# Patient Record
Sex: FEMALE | Race: WHITE | Employment: OTHER | URBAN - METROPOLITAN AREA
[De-identification: names, ages, dates, MRNs, and addresses within clinical notes are randomized per-mention and may not be internally consistent; named-entity substitution may affect disease eponyms.]

---

## 2024-07-17 ENCOUNTER — HOSPITAL ENCOUNTER (EMERGENCY)
Age: 44
Discharge: HOME | End: 2024-07-17
Attending: EMERGENCY MEDICINE
Payer: MEDICAID

## 2024-07-17 ENCOUNTER — APPOINTMENT (OUTPATIENT)
Dept: GENERAL RADIOLOGY | Age: 44
End: 2024-07-17
Payer: MEDICAID

## 2024-07-17 VITALS
BODY MASS INDEX: 29.02 KG/M2 | HEIGHT: 64 IN | WEIGHT: 170 LBS | SYSTOLIC BLOOD PRESSURE: 119 MMHG | HEART RATE: 92 BPM | TEMPERATURE: 98.9 F | RESPIRATION RATE: 16 BRPM | DIASTOLIC BLOOD PRESSURE: 79 MMHG | OXYGEN SATURATION: 98 %

## 2024-07-17 DIAGNOSIS — S43.005A DISLOCATION OF LEFT SHOULDER JOINT, INITIAL ENCOUNTER: ICD-10-CM

## 2024-07-17 DIAGNOSIS — S46.912A STRAIN OF LEFT SHOULDER, INITIAL ENCOUNTER: Primary | ICD-10-CM

## 2024-07-17 PROCEDURE — 73030 X-RAY EXAM OF SHOULDER: CPT

## 2024-07-17 PROCEDURE — 6370000000 HC RX 637 (ALT 250 FOR IP): Performed by: EMERGENCY MEDICINE

## 2024-07-17 PROCEDURE — 99283 EMERGENCY DEPT VISIT LOW MDM: CPT

## 2024-07-17 RX ORDER — CYCLOBENZAPRINE HCL 10 MG
10 TABLET ORAL 3 TIMES DAILY PRN
Qty: 21 TABLET | Refills: 0 | Status: SHIPPED | OUTPATIENT
Start: 2024-07-17 | End: 2024-07-27

## 2024-07-17 RX ORDER — KETOROLAC TROMETHAMINE 10 MG/1
10 TABLET, FILM COATED ORAL EVERY 6 HOURS PRN
Qty: 20 TABLET | Refills: 0 | Status: SHIPPED | OUTPATIENT
Start: 2024-07-17 | End: 2024-07-22

## 2024-07-17 RX ORDER — HYDROCODONE BITARTRATE AND ACETAMINOPHEN 5; 325 MG/1; MG/1
1 TABLET ORAL
Status: COMPLETED | OUTPATIENT
Start: 2024-07-17 | End: 2024-07-17

## 2024-07-17 RX ADMIN — HYDROCODONE BITARTRATE AND ACETAMINOPHEN 1 TABLET: 5; 325 TABLET ORAL at 19:27

## 2024-07-17 ASSESSMENT — PAIN DESCRIPTION - FREQUENCY: FREQUENCY: INTERMITTENT

## 2024-07-17 ASSESSMENT — PAIN DESCRIPTION - ONSET: ONSET: SUDDEN

## 2024-07-17 ASSESSMENT — PAIN DESCRIPTION - DESCRIPTORS: DESCRIPTORS: SHOOTING;SHARP

## 2024-07-17 ASSESSMENT — PAIN DESCRIPTION - ORIENTATION: ORIENTATION: LEFT

## 2024-07-17 ASSESSMENT — PAIN DESCRIPTION - LOCATION: LOCATION: SHOULDER

## 2024-07-17 ASSESSMENT — PAIN - FUNCTIONAL ASSESSMENT
PAIN_FUNCTIONAL_ASSESSMENT: 0-10
PAIN_FUNCTIONAL_ASSESSMENT: PREVENTS OR INTERFERES SOME ACTIVE ACTIVITIES AND ADLS

## 2024-07-17 ASSESSMENT — PAIN SCALES - GENERAL: PAINLEVEL_OUTOF10: 8

## 2024-07-17 ASSESSMENT — PAIN DESCRIPTION - PAIN TYPE: TYPE: ACUTE PAIN

## 2024-07-17 NOTE — ED PROVIDER NOTES
ROM limited secondary to pain.  No tenderness, swelling or deformity to hand, wrist, or elbow. Good ROM to hand, wrist, or elbow  Heart:  Regular rhythm and normal rate  Perfusion:  intact distal pulses  Respiratory:  Respirations nonlabored.     Neurological:  Alert and oriented times 3.  Motor and sensory grossly intact, coordination intact          Psychiatric:  Appropriate      I have reviewed and interpreted all of the currently available lab results from this visit (if applicable):  No results found for this visit on 07/17/24.   Radiographs (if obtained):  [] The following radiograph was interpreted by myself in the absence of a radiologist:   [x] Radiologist's Report Reviewed:  XR SHOULDER LEFT (MIN 2 VIEWS)    (Results Pending)       Procedures:  Procedure Note - Splint:  The benefits, risks, and alternatives of sling were discussed with the patient. Questions were sought and answered. Verbal consent was given for the procedure.  I personally supervised the procedure and inspected the extremity afterwards.  The extremity's neurovascular status was re-checked and was unchanged from the pre-procedure exam. The patient tolerated the procedure without complications. Instructions were given to return immediately for increasing pain, new numbness or weakness, a cold/pale extremity or any other worsening or worrisome concerns.      Chart review shows recent radiographs:  No results found.      MDM:     Discussion with Other Professionals : None    Social Determinants: None    Records Reviewed : None    Chronic conditions affecting care: None    Imaging interpreted and reviewed by myself: left shoulder XR showed no evidence of fracture or foreign body    Patient was given the following medications:  Medications   HYDROcodone-acetaminophen (NORCO) 5-325 MG per tablet 1 tablet (1 tablet Oral Given 7/17/24 1927)       Disposition Discussion:  I suspect a  dislocation (now reduced) and now continued pain afterwards from

## 2024-07-22 ENCOUNTER — OFFICE VISIT (OUTPATIENT)
Dept: ORTHOPEDIC SURGERY | Age: 44
End: 2024-07-22
Payer: MEDICAID

## 2024-07-22 VITALS — SYSTOLIC BLOOD PRESSURE: 134 MMHG | OXYGEN SATURATION: 100 % | HEART RATE: 108 BPM | DIASTOLIC BLOOD PRESSURE: 100 MMHG

## 2024-07-22 DIAGNOSIS — S43.432D SUPERIOR GLENOID LABRUM LESION OF LEFT SHOULDER, SUBSEQUENT ENCOUNTER: Primary | ICD-10-CM

## 2024-07-22 PROCEDURE — 99204 OFFICE O/P NEW MOD 45 MIN: CPT | Performed by: STUDENT IN AN ORGANIZED HEALTH CARE EDUCATION/TRAINING PROGRAM

## 2024-07-22 ASSESSMENT — ENCOUNTER SYMPTOMS
FACIAL SWELLING: 0
VOMITING: 0
EYE PAIN: 0
STRIDOR: 0
WHEEZING: 0
PHOTOPHOBIA: 0
ABDOMINAL PAIN: 0
COLOR CHANGE: 0
BACK PAIN: 0
NAUSEA: 0
EYE REDNESS: 0
COUGH: 0
SHORTNESS OF BREATH: 0

## 2024-07-22 NOTE — PROGRESS NOTES
7/22/2024   Chief Complaint   Patient presents with    ED Follow-up     Left shoulder        History of Present Illness:                             Tamar Macias is a 44 y.o. female left handed patient referred by ED for evaluation and treatment left shoulder pain. This is evaluated as a personal injury.  Patient was reaching behind her self to grab a 15 pound object approximately 10 days prior when she felt a painful pop and shift in her arm.  She was able to manipulate the arm to make it feel better but since then has been having worsening left shoulder pain.  She was seen in the emergency room several days later due to continued and worsening pain.  She was placed in a sling at that time.  No advanced imaging thus far.  This my first time seeing the patient.  No other treatment thus far.    Patient again is currently in sling and this is been very debilitating for her.  She has no use of the shoulder and is eager to return to work.  She is significant mechanical symptoms.    The pain occurs every day and when active. Location of pain is anterior and posterior joint line, biceps. No history of dislocation. Symptoms are aggravated by ADL's, repetitive use, work at or above shoulder height or behind her body, difficulty sleeping on affected side. Symptoms are diminished by rest, avoiding the painful activities.     Limited activities include: lifting, repetitive use, work at or above shoulder height or behind her body, difficulty sleeping, difficulty with ADLs. No stiffness is reported.     Patient denies numbness, tingling, altered sensation in the extremity.    Related history: negative for prior surgery, trauma, arthritis or disorders.     Is affecting ADLs.  Pain is 6/10 at it's worst.    Outside reports reviewed: ER imaging reviewed.     Patient's medications, allergies, past medical, surgical, social and family histories were reviewed and updated as appropriate.     Medical History  Patient's

## 2024-07-22 NOTE — PATIENT INSTRUCTIONS
No lifting, pushing or pulling exceeding 5 pounds.   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   Follow up with MRI Arthrogram results.     Central scheduling [phone] will be calling you to schedule your MRI.  If you do not hear from them with in a week give them a call.

## 2024-07-22 NOTE — PROGRESS NOTES
Patient is here for follow up on left shoulder, seen in ED 7/17/24. States she had a dislocation around 7/12/24 when she was trying to lift a ~15 pound object off of the ground and felt her \"arm pop out.\" States her boyfriend was able to reduce her shoulder, but pain did not improve over the following week while she was using her arm at work. Has been using sling since seen in 7/17/24.     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   Left hand dominant.

## 2024-07-23 ENCOUNTER — TELEPHONE (OUTPATIENT)
Dept: ORTHOPEDIC SURGERY | Age: 44
End: 2024-07-23

## 2024-07-23 NOTE — TELEPHONE ENCOUNTER
Spoke with Rafaela at [facility] for a STAT Arthrogram of the left shoulder.     They didn't find a referral for the arthrogram.    Referral was called in as STAT to [facility]  Scheduled for Thursday 10 AM 07/25/2024    Address: [address] [address]  Please have someone drive ya home.  List of medication, no snap, button, clip, and no metal.    Patient was advised to bring a disc

## 2024-08-05 ENCOUNTER — OFFICE VISIT (OUTPATIENT)
Dept: ORTHOPEDIC SURGERY | Age: 44
End: 2024-08-05
Payer: MEDICAID

## 2024-08-05 VITALS — SYSTOLIC BLOOD PRESSURE: 142 MMHG | OXYGEN SATURATION: 100 % | HEART RATE: 112 BPM | DIASTOLIC BLOOD PRESSURE: 90 MMHG

## 2024-08-05 DIAGNOSIS — M75.22 BICEPS TENDINITIS OF LEFT SHOULDER: Primary | ICD-10-CM

## 2024-08-05 DIAGNOSIS — M67.912 TENDINOPATHY OF LEFT ROTATOR CUFF: ICD-10-CM

## 2024-08-05 DIAGNOSIS — M75.82 ROTATOR CUFF TENDINITIS, LEFT: ICD-10-CM

## 2024-08-05 PROCEDURE — 20610 DRAIN/INJ JOINT/BURSA W/O US: CPT | Performed by: STUDENT IN AN ORGANIZED HEALTH CARE EDUCATION/TRAINING PROGRAM

## 2024-08-05 PROCEDURE — 99214 OFFICE O/P EST MOD 30 MIN: CPT | Performed by: STUDENT IN AN ORGANIZED HEALTH CARE EDUCATION/TRAINING PROGRAM

## 2024-08-05 RX ORDER — MELOXICAM 15 MG/1
15 TABLET ORAL DAILY PRN
Qty: 30 TABLET | Refills: 0 | Status: SHIPPED | OUTPATIENT
Start: 2024-08-05

## 2024-08-05 RX ORDER — TRIAMCINOLONE ACETONIDE 40 MG/ML
40 INJECTION, SUSPENSION INTRA-ARTICULAR; INTRAMUSCULAR ONCE
Status: COMPLETED | OUTPATIENT
Start: 2024-08-05 | End: 2024-08-05

## 2024-08-05 RX ADMIN — TRIAMCINOLONE ACETONIDE 40 MG: 40 INJECTION, SUSPENSION INTRA-ARTICULAR; INTRAMUSCULAR at 15:13

## 2024-08-05 ASSESSMENT — ENCOUNTER SYMPTOMS
EYE REDNESS: 0
COUGH: 0
FACIAL SWELLING: 0
BACK PAIN: 0
COLOR CHANGE: 0
ABDOMINAL PAIN: 0
PHOTOPHOBIA: 0
NAUSEA: 0
WHEEZING: 0
SHORTNESS OF BREATH: 0
STRIDOR: 0
EYE PAIN: 0
VOMITING: 0

## 2024-08-05 NOTE — PROGRESS NOTES
the defined types were placed in this encounter.  Left subacromial Shoulder Injection Procedure Note   Pre-operative Diagnosis: Left rotator cuff tendinitis   Post-operative Diagnosis: same   Indications: Symptomatic relief of tendinitis   Anesthesia: Lidocaine 1% and marcaine 0.5% without epinephrine without added sodium bicarbonate   Procedure Details   Verbal consent was obtained for the procedure. The shoulder was prepped with alcohol. A 22 gauge needle was advanced into the subacromial space through posterior approach without difficulty The space was then injected with 1 ml 1% lidocaine and 1 ml 0.5% marcaine and 1 ml of triamcinolone (KENALOG) 40mg/ml. The injection site was cleansed with isopropyl alcohol and a dressing was applied.   Complications: None; patient tolerated the procedure well. Symptoms were improved immediately after the injection.     Assessment and Plan    A: Left shoulder pain, biceps tendinitis with rotator cuff tendinopathy    P:   I had a thorough conversation the patient regarding her current physical exam findings, imaging and treatment course.  I explained that there were no concerning findings for any type of tear but she does have inflammation on her biceps and the rotator cuff which I do feel is causing issue.  I explained that these issues are nonsurgical in nature but we did briefly discuss what surgery would entail if needed in the future but we are trying to avoid that at this time.  She is also been doing excess amounts of guarding which I feel will become a problem if is not addressed.  At this time I suggest a subacromial injection to help with some the inflammation and pain and she agreed.  This was performed without complication.  She was also given prescriptions for Mobic as well as a compound cream to be used in the shoulder.  We also placed a referral to formal outpatient physical therapy to work on range of motion and strengthening to begin this week in order to

## 2024-08-05 NOTE — PATIENT INSTRUCTIONS
Continue weight-bearing as tolerated.  Continue range of motion exercises as instructed.  Ice and elevate as needed.  Tylenol or Motrin for pain.  Follow up in 3 months.

## 2024-08-08 ENCOUNTER — TELEPHONE (OUTPATIENT)
Dept: ORTHOPEDIC SURGERY | Age: 44
End: 2024-08-08

## 2024-09-09 ENCOUNTER — OFFICE VISIT (OUTPATIENT)
Dept: ORTHOPEDIC SURGERY | Age: 44
End: 2024-09-09
Payer: MEDICAID

## 2024-09-09 VITALS — SYSTOLIC BLOOD PRESSURE: 138 MMHG | HEART RATE: 87 BPM | DIASTOLIC BLOOD PRESSURE: 84 MMHG | OXYGEN SATURATION: 97 %

## 2024-09-09 DIAGNOSIS — M75.82 ROTATOR CUFF TENDINITIS, LEFT: Primary | ICD-10-CM

## 2024-09-09 PROCEDURE — 99213 OFFICE O/P EST LOW 20 MIN: CPT | Performed by: STUDENT IN AN ORGANIZED HEALTH CARE EDUCATION/TRAINING PROGRAM

## 2024-09-09 ASSESSMENT — ENCOUNTER SYMPTOMS
PHOTOPHOBIA: 0
EYE REDNESS: 0
VOMITING: 0
EYE PAIN: 0
NAUSEA: 0
COUGH: 0
ABDOMINAL PAIN: 0
FACIAL SWELLING: 0
STRIDOR: 0
BACK PAIN: 0
WHEEZING: 0
SHORTNESS OF BREATH: 0
COLOR CHANGE: 0

## 2024-09-20 ENCOUNTER — TELEPHONE (OUTPATIENT)
Dept: ORTHOPEDIC SURGERY | Age: 44
End: 2024-09-20

## 2024-10-21 ENCOUNTER — OFFICE VISIT (OUTPATIENT)
Dept: ORTHOPEDIC SURGERY | Age: 44
End: 2024-10-21
Payer: MEDICAID

## 2024-10-21 ENCOUNTER — TELEPHONE (OUTPATIENT)
Dept: ORTHOPEDIC SURGERY | Age: 44
End: 2024-10-21

## 2024-10-21 VITALS — HEART RATE: 98 BPM | OXYGEN SATURATION: 99 %

## 2024-10-21 DIAGNOSIS — M75.22 BICEPS TENDINITIS OF LEFT SHOULDER: Primary | ICD-10-CM

## 2024-10-21 PROCEDURE — 20611 DRAIN/INJ JOINT/BURSA W/US: CPT | Performed by: STUDENT IN AN ORGANIZED HEALTH CARE EDUCATION/TRAINING PROGRAM

## 2024-10-21 PROCEDURE — 99213 OFFICE O/P EST LOW 20 MIN: CPT | Performed by: STUDENT IN AN ORGANIZED HEALTH CARE EDUCATION/TRAINING PROGRAM

## 2024-10-21 RX ORDER — TRIAMCINOLONE ACETONIDE 40 MG/ML
40 INJECTION, SUSPENSION INTRA-ARTICULAR; INTRAMUSCULAR ONCE
Status: COMPLETED | OUTPATIENT
Start: 2024-10-21 | End: 2024-10-21

## 2024-10-21 RX ORDER — NAPROXEN 500 MG/1
500 TABLET ORAL 2 TIMES DAILY WITH MEALS
Qty: 60 TABLET | Refills: 0 | Status: SHIPPED | OUTPATIENT
Start: 2024-10-21

## 2024-10-21 RX ORDER — CYCLOBENZAPRINE HCL 5 MG
5 TABLET ORAL 2 TIMES DAILY PRN
Qty: 30 TABLET | Refills: 0 | Status: SHIPPED | OUTPATIENT
Start: 2024-10-21 | End: 2024-10-31

## 2024-10-21 RX ADMIN — TRIAMCINOLONE ACETONIDE 40 MG: 40 INJECTION, SUSPENSION INTRA-ARTICULAR; INTRAMUSCULAR at 16:20

## 2024-10-21 ASSESSMENT — ENCOUNTER SYMPTOMS
SHORTNESS OF BREATH: 0
NAUSEA: 0
COUGH: 0
VOMITING: 0
STRIDOR: 0
COLOR CHANGE: 0
BACK PAIN: 0
FACIAL SWELLING: 0
WHEEZING: 0
PHOTOPHOBIA: 0
ABDOMINAL PAIN: 0
EYE REDNESS: 0
EYE PAIN: 0

## 2024-10-21 NOTE — PATIENT INSTRUCTIONS
POST-INJECTION INSTRUCTIONS: Ice  for 15-20 minutes as needed to relieve any injection site related pain. Decrease activity for the next 24 to 48 hours. May use prescription or OTC pain relievers as needed. If at any time you are concerned you may contact the office for further instructions or care.   Follow up in 3 months. Please call to move up the appointment if you are still having pain.   Medications sent to your pharmacy.

## 2024-10-21 NOTE — PROGRESS NOTES
10/21/2024   Chief Complaint   Patient presents with    Follow-up     Left shoulder      Updated HPI: Patient returns today for reevaluation of her left shoulder.  The most recent injection not provide substantial relief overall.  She does state that the rotator cuff pain is much better but she is having a lot of pain at the biceps groove and into the biceps muscle belly.  No new injuries or complaints.  Has been focusing on this area with physical therapy.    Previous HPI (9/9/2024): Patient returns today for reevaluation of her left shoulder.  She has been in therapy and states that she has been slowly improving and does feel that the injection was helpful.  Again feels that she has room to improve but overall does feel that she is slowly getting there.    Previous HPI (8/5/2024): Patient returns today for reevaluation of her left shoulder as well as MRI review.  No new injuries or complaints.  Continues to utilize the sling due to pain.  Continues to have issues with significant pain in the shoulder region.  No new injuries or complaints.    Previous HPI (7/22/2024):                             Tamar Macias is a 44 y.o. female left handed patient referred by ED for evaluation and treatment left shoulder pain. This is evaluated as a personal injury.  Patient was reaching behind her self to grab a 15 pound object approximately 10 days prior when she felt a painful pop and shift in her arm.  She was able to manipulate the arm to make it feel better but since then has been having worsening left shoulder pain.  She was seen in the emergency room several days later due to continued and worsening pain.  She was placed in a sling at that time.  No advanced imaging thus far.  This my first time seeing the patient.  No other treatment thus far.    Patient again is currently in sling and this is been very debilitating for her.  She has no use of the shoulder and is eager to return to work.  She is significant

## 2024-10-21 NOTE — PROGRESS NOTES
Pt comes in today for a 6  wk follow up for her LT shoulder. She was last seen on 9/9/24 She has a LT shoulder subacromial injection on 8/5/24.  She states that she has continued PT and is very slowly progressing. She has developed pain in her axilla. Pain is currently 8/10. Her pain is located in the muscle belly of the bicep and posterior portion of her LT shoulder. She has been using heat and a TENS unit to help with muscle spasms. She denies any new falls or injuries.

## 2024-10-21 NOTE — TELEPHONE ENCOUNTER
Patient is on disability and was seen in office today. She is remaining out of work until her next appointment on 11/18/24.

## 2024-11-18 ENCOUNTER — OFFICE VISIT (OUTPATIENT)
Dept: ORTHOPEDIC SURGERY | Age: 44
End: 2024-11-18
Payer: MEDICAID

## 2024-11-18 ENCOUNTER — TELEPHONE (OUTPATIENT)
Dept: ORTHOPEDIC SURGERY | Age: 44
End: 2024-11-18

## 2024-11-18 VITALS — HEIGHT: 64 IN | HEART RATE: 99 BPM | BODY MASS INDEX: 29.18 KG/M2 | OXYGEN SATURATION: 98 %

## 2024-11-18 DIAGNOSIS — M75.22 BICEPS TENDINITIS OF LEFT SHOULDER: Primary | ICD-10-CM

## 2024-11-18 PROCEDURE — 99213 OFFICE O/P EST LOW 20 MIN: CPT | Performed by: STUDENT IN AN ORGANIZED HEALTH CARE EDUCATION/TRAINING PROGRAM

## 2024-11-18 ASSESSMENT — ENCOUNTER SYMPTOMS
COUGH: 0
SHORTNESS OF BREATH: 0
COLOR CHANGE: 0
FACIAL SWELLING: 0
NAUSEA: 0
STRIDOR: 0
WHEEZING: 0
ABDOMINAL PAIN: 0
VOMITING: 0
EYE REDNESS: 0
PHOTOPHOBIA: 0
BACK PAIN: 0
EYE PAIN: 0

## 2024-11-18 NOTE — PROGRESS NOTES
Pt comes in today for a 1 month follow up for LT bicep tendinitis. Last visit on 10/21/24, a bicipital groove injection was given at that time.  Pain is 6/10 today. She is currently in PT and doing well. She been using ice and heat. She still has spasms at night but they are less frequent. She still has sensitivity in her axilla. She denies any new falls or injuries.

## 2024-11-18 NOTE — PATIENT INSTRUCTIONS
Continue physical therapy as tolerated.  cream prescription given today.  Telephone call in 4 weeks to discuss return to work.  In office visit in 2 months

## 2024-11-18 NOTE — PROGRESS NOTES
11/18/2024   Chief Complaint   Patient presents with    Follow-up     Left shoulder      Updated HPI: Patient returns today for reevaluation of her left shoulder.  She states that overall she was much improved by the most recent injection.  Continues out of work.  Does feel that physical therapy has been improving her shoulder pain.    Previous HPI (10/21/2024): Patient returns today for reevaluation of her left shoulder.  The most recent injection not provide substantial relief overall.  She does state that the rotator cuff pain is much better but she is having a lot of pain at the biceps groove and into the biceps muscle belly.  No new injuries or complaints.  Has been focusing on this area with physical therapy.    Previous HPI (9/9/2024): Patient returns today for reevaluation of her left shoulder.  She has been in therapy and states that she has been slowly improving and does feel that the injection was helpful.  Again feels that she has room to improve but overall does feel that she is slowly getting there.    Previous HPI (8/5/2024): Patient returns today for reevaluation of her left shoulder as well as MRI review.  No new injuries or complaints.  Continues to utilize the sling due to pain.  Continues to have issues with significant pain in the shoulder region.  No new injuries or complaints.    Previous HPI (7/22/2024):                             Tamar Macias is a 44 y.o. female left handed patient referred by ED for evaluation and treatment left shoulder pain. This is evaluated as a personal injury.  Patient was reaching behind her self to grab a 15 pound object approximately 10 days prior when she felt a painful pop and shift in her arm.  She was able to manipulate the arm to make it feel better but since then has been having worsening left shoulder pain.  She was seen in the emergency room several days later due to continued and worsening pain.  She was placed in a sling at that time.  No

## 2024-12-16 ENCOUNTER — TELEPHONE (OUTPATIENT)
Dept: ORTHOPEDIC SURGERY | Age: 44
End: 2024-12-16

## 2024-12-16 NOTE — TELEPHONE ENCOUNTER
I talked with Tamar about her return to work. 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 She is only lifting 2-3 lbs in PT. She feels that she can maybe return to work in 2 weeks. We will call her in 2 weeks to check on her status. I will provide a work note keeping her off of work for 2 more weeks.

## 2024-12-18 NOTE — TELEPHONE ENCOUNTER
Called [employer] about extension for [facility] ppw. Once received will update for her I have her leave extension is until 01/06/2025.

## 2025-01-20 ENCOUNTER — OFFICE VISIT (OUTPATIENT)
Dept: ORTHOPEDIC SURGERY | Age: 45
End: 2025-01-20
Payer: MEDICAID

## 2025-01-20 ENCOUNTER — TELEPHONE (OUTPATIENT)
Dept: ORTHOPEDIC SURGERY | Age: 45
End: 2025-01-20

## 2025-01-20 VITALS — HEART RATE: 110 BPM | OXYGEN SATURATION: 97 %

## 2025-01-20 DIAGNOSIS — M75.22 BICEPS TENDINITIS OF LEFT SHOULDER: Primary | ICD-10-CM

## 2025-01-20 PROCEDURE — 20610 DRAIN/INJ JOINT/BURSA W/O US: CPT | Performed by: STUDENT IN AN ORGANIZED HEALTH CARE EDUCATION/TRAINING PROGRAM

## 2025-01-20 PROCEDURE — 99214 OFFICE O/P EST MOD 30 MIN: CPT | Performed by: STUDENT IN AN ORGANIZED HEALTH CARE EDUCATION/TRAINING PROGRAM

## 2025-01-20 RX ORDER — TRIAMCINOLONE ACETONIDE 40 MG/ML
40 INJECTION, SUSPENSION INTRA-ARTICULAR; INTRAMUSCULAR ONCE
Status: COMPLETED | OUTPATIENT
Start: 2025-01-20 | End: 2025-01-20

## 2025-01-20 RX ADMIN — TRIAMCINOLONE ACETONIDE 40 MG: 40 INJECTION, SUSPENSION INTRA-ARTICULAR; INTRAMUSCULAR at 16:01

## 2025-01-20 ASSESSMENT — ENCOUNTER SYMPTOMS
PHOTOPHOBIA: 0
COLOR CHANGE: 0
VOMITING: 0
STRIDOR: 0
BACK PAIN: 0
WHEEZING: 0
ABDOMINAL PAIN: 0
NAUSEA: 0
COUGH: 0
SHORTNESS OF BREATH: 0
FACIAL SWELLING: 0
EYE PAIN: 0
EYE REDNESS: 0

## 2025-01-20 NOTE — TELEPHONE ENCOUNTER
Patient is to be taken off of work for the next 6 weeks until 03/04/2025.    Patient is still unable to do any heavy lifting, pushing, or pulling with the left arm nothing greater than 5 pounds. Patient received a steroid injection in the shoulder today.  Will attempted to return her back to work on 03/04/2025 with no restrictions.

## 2025-01-20 NOTE — PATIENT INSTRUCTIONS
Continue weight-bearing as tolerated.  Continue range of motion exercises as instructed.  Ice and elevate as needed.  Tylenol or Motrin for pain.  Injection given into the left shoulder.  Follow up in 6 weeks.    We are committed to providing you the best care possible.  If you receive a survey after visiting one of our offices, please take time to share your experience concerning your physician office visit.  These surveys are confidential and no health information about you is shared.  We are eager to improve for you and we are counting on your feedback to help make that happen. '

## 2025-01-20 NOTE — PROGRESS NOTES
Patient is here for follow up on biceps tendinitis of left shoulder. Last seen on 11/18/24. Bicipital groove injection on 10/21/24.    Today her ROM is well improved, but still struggles with abductive ROM. Notes increased pain with adductive ROM. Muscle spasms resolved. Strength improving slowly with PT. She denies new injury, numbness or tingling in left arm.

## 2025-01-20 NOTE — PROGRESS NOTES
1/20/2025   Chief Complaint   Patient presents with    Shoulder Pain     Left      Updated HPI: Pain returns today for reevaluation of her left shoulder.  She states she is made progress but continues to have some pain in the shoulder.  No new injuries or complaints.  Continues to work with physical therapy.  Once again states she is not ready to return to work due to her inability to lift more than 5 pounds    Previous HPI (11/18/2024): Patient returns today for reevaluation of her left shoulder.  She states that overall she was much improved by the most recent injection.  Continues out of work.  Does feel that physical therapy has been improving her shoulder pain.    Previous HPI (10/21/2024): Patient returns today for reevaluation of her left shoulder.  The most recent injection not provide substantial relief overall.  She does state that the rotator cuff pain is much better but she is having a lot of pain at the biceps groove and into the biceps muscle belly.  No new injuries or complaints.  Has been focusing on this area with physical therapy.    Previous HPI (9/9/2024): Patient returns today for reevaluation of her left shoulder.  She has been in therapy and states that she has been slowly improving and does feel that the injection was helpful.  Again feels that she has room to improve but overall does feel that she is slowly getting there.    Previous HPI (8/5/2024): Patient returns today for reevaluation of her left shoulder as well as MRI review.  No new injuries or complaints.  Continues to utilize the sling due to pain.  Continues to have issues with significant pain in the shoulder region.  No new injuries or complaints.    Previous HPI (7/22/2024):                             Tamar Macias is a 44 y.o. female left handed patient referred by ED for evaluation and treatment left shoulder pain. This is evaluated as a personal injury.  Patient was reaching behind her self to grab a 15 pound object

## 2025-03-03 ENCOUNTER — OFFICE VISIT (OUTPATIENT)
Dept: ORTHOPEDIC SURGERY | Age: 45
End: 2025-03-03
Payer: MEDICAID

## 2025-03-03 VITALS — OXYGEN SATURATION: 98 % | HEART RATE: 79 BPM

## 2025-03-03 DIAGNOSIS — M75.22 BICEPS TENDINITIS OF LEFT SHOULDER: Primary | ICD-10-CM

## 2025-03-03 PROCEDURE — 99213 OFFICE O/P EST LOW 20 MIN: CPT | Performed by: STUDENT IN AN ORGANIZED HEALTH CARE EDUCATION/TRAINING PROGRAM

## 2025-03-03 ASSESSMENT — ENCOUNTER SYMPTOMS
PHOTOPHOBIA: 0
COLOR CHANGE: 0
NAUSEA: 0
VOMITING: 0
WHEEZING: 0
SHORTNESS OF BREATH: 0
COUGH: 0
BACK PAIN: 0
ABDOMINAL PAIN: 0
EYE REDNESS: 0
STRIDOR: 0
FACIAL SWELLING: 0
EYE PAIN: 0

## 2025-03-03 NOTE — PROGRESS NOTES
Discuss RTW, repetitive lifting of 35-50 pounds. No new injuries. Has been doing PT 2xWeekly        
definite   5.  Normal articular cartilage       Office Procedures:  No orders of the defined types were placed in this encounter.      Assessment and Plan    A: Left shoulder pain, biceps tendinitis with rotator cuff tendinopathy    P:   I again had a lengthy conversation with the patient regarding her current physical Godfrey finds her treatment course.  I do agree that she is much improved and she is heading in the right direction.  We will continue her restrictions for 2 more weeks as she is almost at the point that she meets the minimum required lifting for work.  She can return to working 2 weeks without restriction.  She will call me if she has any issues otherwise we will continue the restrictions into that point and then no further restrictions needed.  Follow-up with me as needed.    Electronically signed by David Lemus DO on 3/3/2025 at 3:28 PM

## 2025-03-27 ENCOUNTER — TELEPHONE (OUTPATIENT)
Dept: ORTHOPEDIC SURGERY | Age: 45
End: 2025-03-27

## 2025-03-27 NOTE — TELEPHONE ENCOUNTER
Pt called in today and asked to make an appt she states her left shoulder is hurting terrible even with the restrictions that she has at work . Her appt his scheduled for 4/14/25 but she doesn't know what to do about working until then - she asks for a call so she can explain better to Allan

## 2025-03-28 NOTE — TELEPHONE ENCOUNTER
Patient called stating she is having increase pain while working. Pt states she has notice a sharp stabbing pain since she has been back to work. I adivsed patient to stretch, taking tylenol and Ibuprofen. Pt requesting a call back

## 2025-03-31 NOTE — TELEPHONE ENCOUNTER
Patient was returning a call regarding her work restrictions. I let her know that Dr. Lemus approved her being out of work until her appt on 4/14/25.   Does that start now and will an extension need to be added to her FMLA ppw?

## 2025-04-01 NOTE — TELEPHONE ENCOUNTER
Spoke with Pt to inform that new paperwork would be needed. Also advised that Pt would need to confirm if [employer] will reopen her claim or require her to have a new claim.   Pt stated that she understood she just spoke with her manager less than 30 mins ago and was advised to call [employer]ha tomorrow 04/02/2025 about her claim.     Understanding by all.

## 2025-04-14 ENCOUNTER — TELEPHONE (OUTPATIENT)
Dept: ORTHOPEDIC SURGERY | Age: 45
End: 2025-04-14

## 2025-04-14 DIAGNOSIS — M67.912 TENDINOPATHY OF LEFT ROTATOR CUFF: Primary | ICD-10-CM

## 2025-04-14 NOTE — TELEPHONE ENCOUNTER
Patient stopped in office today.     She is still having issues with her shoulder pain at work. Dr. Lemus advised three of the following:     Consider changing positions/employment to accommodate her shoulder pain as she improved well with being off of work.   Consider getting a second opinion from another provider.   Re-evaluate previous MRI to make sure nothing was missed.     We will call her on 4/16/25 to see if she discussed employment options with her manager and to discuss any MRI findings when reviewed.

## 2025-04-30 ENCOUNTER — HOSPITAL ENCOUNTER (OUTPATIENT)
Dept: MRI IMAGING | Age: 45
Discharge: HOME | End: 2025-04-30
Attending: STUDENT IN AN ORGANIZED HEALTH CARE EDUCATION/TRAINING PROGRAM

## 2025-04-30 DIAGNOSIS — Z00.6 EXAMINATION FOR NORMAL COMPARISON OR CONTROL IN CLINICAL RESEARCH: ICD-10-CM

## 2025-04-30 NOTE — TELEPHONE ENCOUNTER
Spoke with Tamar and Dr. Lemus.    Patient agreeable to new MRI Arthrogram. If unremarkable, we will discuss referral for second opinion.     New MRI Arthrogram order placed.